# Patient Record
Sex: FEMALE | Race: WHITE | NOT HISPANIC OR LATINO
[De-identification: names, ages, dates, MRNs, and addresses within clinical notes are randomized per-mention and may not be internally consistent; named-entity substitution may affect disease eponyms.]

---
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
## 2025-02-01 NOTE — ED PEDIATRIC TRIAGE NOTE - HEART RATE METHOD
Problem: Discharge Planning  Goal: Discharge to home or other facility with appropriate resources  2/1/2025 1144 by Kulwant Yu  Outcome: Progressing  2/1/2025 1138 by Kulwant Yu  Outcome: Progressing  1/31/2025 2226 by Thomas Bowles RN  Outcome: Progressing     Problem: Pain  Goal: Verbalizes/displays adequate comfort level or baseline comfort level  2/1/2025 1144 by Kulawnt Yu  Outcome: Progressing  2/1/2025 1138 by Kulwant Yu  Outcome: Progressing  1/31/2025 2226 by Thomas Bowles RN  Outcome: Progressing     Problem: Safety - Adult  Goal: Free from fall injury  2/1/2025 1144 by Kulwant Yu  Outcome: Progressing  2/1/2025 1138 by Kulwant Yu  Outcome: Progressing  1/31/2025 2226 by Thomas Bowles RN  Outcome: Progressing     Problem: Chronic Conditions and Co-morbidities  Goal: Patient's chronic conditions and co-morbidity symptoms are monitored and maintained or improved  2/1/2025 1144 by Kulwant Yu  Outcome: Progressing  2/1/2025 1138 by Kulwant Yu  Outcome: Progressing  1/31/2025 2226 by Thomas Bowles RN  Outcome: Progressing     Problem: ABCDS Injury Assessment  Goal: Absence of physical injury  2/1/2025 1144 by Kulwant Yu  Outcome: Progressing  2/1/2025 1138 by Kulwant Yu  Outcome: Progressing  1/31/2025 2226 by Thomas Bowles RN  Outcome: Progressing      pulse oximetry